# Patient Record
Sex: MALE | Race: WHITE | NOT HISPANIC OR LATINO | Employment: UNEMPLOYED | URBAN - METROPOLITAN AREA
[De-identification: names, ages, dates, MRNs, and addresses within clinical notes are randomized per-mention and may not be internally consistent; named-entity substitution may affect disease eponyms.]

---

## 2025-03-29 ENCOUNTER — HOSPITAL ENCOUNTER (EMERGENCY)
Facility: HOSPITAL | Age: 60
Discharge: HOME | End: 2025-03-29
Attending: STUDENT IN AN ORGANIZED HEALTH CARE EDUCATION/TRAINING PROGRAM
Payer: COMMERCIAL

## 2025-03-29 VITALS
DIASTOLIC BLOOD PRESSURE: 79 MMHG | OXYGEN SATURATION: 94 % | WEIGHT: 225 LBS | TEMPERATURE: 97.2 F | HEART RATE: 64 BPM | SYSTOLIC BLOOD PRESSURE: 128 MMHG | RESPIRATION RATE: 16 BRPM | HEIGHT: 70 IN | BODY MASS INDEX: 32.21 KG/M2

## 2025-03-29 DIAGNOSIS — L30.0 NUMMULAR ECZEMA: Primary | ICD-10-CM

## 2025-03-29 DIAGNOSIS — L73.9 FOLLICULITIS: ICD-10-CM

## 2025-03-29 PROCEDURE — 2500000005 HC RX 250 GENERAL PHARMACY W/O HCPCS

## 2025-03-29 PROCEDURE — 99282 EMERGENCY DEPT VISIT SF MDM: CPT | Performed by: STUDENT IN AN ORGANIZED HEALTH CARE EDUCATION/TRAINING PROGRAM

## 2025-03-29 RX ORDER — TRIAMCINOLONE ACETONIDE 1 MG/G
OINTMENT TOPICAL 2 TIMES DAILY
Qty: 255 G | Refills: 0 | Status: SHIPPED | OUTPATIENT
Start: 2025-03-29

## 2025-03-29 RX ORDER — CLINDAMYCIN PHOSPHATE 10 UG/ML
LOTION TOPICAL 2 TIMES DAILY
Qty: 60 ML | Refills: 0 | Status: SHIPPED | OUTPATIENT
Start: 2025-03-29 | End: 2026-03-29

## 2025-03-29 RX ORDER — HYDROCORTISONE 1 %
CREAM (GRAM) TOPICAL ONCE
Status: COMPLETED | OUTPATIENT
Start: 2025-03-29 | End: 2025-03-29

## 2025-03-29 RX ADMIN — HYDROCORTISONE: 1 CREAM TOPICAL at 22:58

## 2025-03-29 ASSESSMENT — PAIN SCALES - GENERAL
PAINLEVEL_OUTOF10: 0 - NO PAIN
PAINLEVEL_OUTOF10: 0 - NO PAIN

## 2025-03-29 ASSESSMENT — COLUMBIA-SUICIDE SEVERITY RATING SCALE - C-SSRS
2. HAVE YOU ACTUALLY HAD ANY THOUGHTS OF KILLING YOURSELF?: NO
1. IN THE PAST MONTH, HAVE YOU WISHED YOU WERE DEAD OR WISHED YOU COULD GO TO SLEEP AND NOT WAKE UP?: NO
6. HAVE YOU EVER DONE ANYTHING, STARTED TO DO ANYTHING, OR PREPARED TO DO ANYTHING TO END YOUR LIFE?: NO

## 2025-03-29 ASSESSMENT — PAIN - FUNCTIONAL ASSESSMENT: PAIN_FUNCTIONAL_ASSESSMENT: 0-10

## 2025-03-30 NOTE — DISCHARGE INSTRUCTIONS
You evaluated in the emergency department for wounds on both your legs. This is likely a type of eczema. You'll be given a prescription for a steroid cream to apply to your legs twice daily. A referral to dermatology and the wound care clinic have also been placed. The wound care clinic will be able to help monitor the healing and provide additional medications if needed. Please return to the ED if you have any new or worsening symptoms, fever, worsening redness on her legs or other concerns.

## 2025-03-30 NOTE — ED TRIAGE NOTES
60 y/o male complains of bilateral leg wounds/infection that has been present for several week. Complains of itching. States he was taking an antibiotic he got from brother but no improvement

## 2025-03-30 NOTE — ED PROVIDER NOTES
History of Present Illness     History provided by: Patient  Limitations to History: None  External Records Reviewed with Brief Summary: None    HPI:  Emily Porter is a 59 y.o. male  with no significant past medical history presenting for concern of wounds on bilateral lower extremities. Patient reports that the wound initially started as abrasions on his knees which he gets from his occupation as a . The wounds began to spread down his legs bilaterally and have been present for about two months. He started having worsening itching in the past week after swimming in a chlorinated pool. At this time he started taking a family members doxycycline without improvement. Patient denies any other known exposure or trauma to his lower extremities. He does report allergy to cat and dog dander with intense pruritus, but no known recent exposure. He has never had a reaction like this before. He smokes about a pack a day.      Physical Exam   Triage vitals:  T 36.2 °C (97.2 °F)  HR 82  /75  RR 16  O2 96 % None (Room air)    General: Awake, alert, in no acute distress  Eyes: Gaze conjugate. EOMI. No scleral icterus or injection.  HENT: Normo-cephalic, atraumatic. No stridor.   CV: Regular rate, regular rhythm. DP pulses 2+ bilaterally. No murmurs.  Resp: Breathing non-labored, speaking in full sentences.  Clear to auscultation bilaterally. No wheezing, rales, rhonchi.   MSK/Extremities: No gross bony deformities. Moving all extremities. No edema.   Skin: Warm. Appropriate color.   Left Leg  Right Leg  Neuro: Alert and oriented x3. Face symmetric. Speech is fluent.  Gross strength and sensation intact in b/l UE and Les.  Psych: Appropriate mood and affect      Medical Decision Making & ED Course   Medical Decision Makin y.o. male presenting with bilateral lower extremity rash. On exam he is hemodynamically stable and well appearing. His legs are noted to have scaling rash bilaterally with  erythema of the follicles differential diagnosis includes but is not limited to contact dermatitis, eczema, folliculitis, cellulitis, venous stasis. Cellulitis is less likely given no warmth on exam, erythema confined to the  scaling areas, in addition to it being pruritic. With recent exposure to chlorinated water, this could be a contact dermatitis or folliculitis. With scaling, believe this is likely a nummular eczema. Will give patient hydrocortisone here in the ED for relief of the pruritus. Patient will be sent home with prescription for topical clindamycin and topical steroid. Will give referral to wound care clinic to follow the patient outpatient and monitor healing in addition to a referral to dermatology for further assessment. Instructed patient on avoiding itching, keeping the area clean. He expressed understanding will be discharged home in stable condition.      ED Course:  Diagnoses as of 03/30/25 1652   Nummular eczema   Folliculitis       Disposition   As a result of the work-up, the patient was discharged home.  he was informed of his diagnosis and instructed to come back with any concerns or worsening of condition.  he and was agreeable to the plan as discussed above.  he was given the opportunity to ask questions.  All of the patient's questions were answered.    Procedures   Procedures    Patient seen and discussed with ED attending physician.    Ayaz Nj DO  Emergency Medicine PGY1     Ayaz Nj DO  Resident  03/30/25 1652